# Patient Record
Sex: FEMALE
[De-identification: names, ages, dates, MRNs, and addresses within clinical notes are randomized per-mention and may not be internally consistent; named-entity substitution may affect disease eponyms.]

---

## 2017-11-21 ENCOUNTER — CHARTING TRANS (OUTPATIENT)
Dept: OTHER | Age: 27
End: 2017-11-21

## 2017-11-21 ENCOUNTER — LAB SERVICES (OUTPATIENT)
Dept: OTHER | Age: 27
End: 2017-11-21

## 2017-11-21 LAB — RAPID STREP GROUP A: POSITIVE

## 2018-11-02 VITALS
HEIGHT: 63 IN | OXYGEN SATURATION: 99 % | BODY MASS INDEX: 44.3 KG/M2 | RESPIRATION RATE: 18 BRPM | SYSTOLIC BLOOD PRESSURE: 124 MMHG | WEIGHT: 250 LBS | HEART RATE: 108 BPM | TEMPERATURE: 103 F | DIASTOLIC BLOOD PRESSURE: 68 MMHG

## 2024-04-03 ENCOUNTER — OFFICE VISIT (OUTPATIENT)
Dept: OBGYN CLINIC | Facility: CLINIC | Age: 34
End: 2024-04-03

## 2024-04-03 VITALS
HEIGHT: 63 IN | BODY MASS INDEX: 46.42 KG/M2 | HEART RATE: 77 BPM | SYSTOLIC BLOOD PRESSURE: 139 MMHG | WEIGHT: 262 LBS | DIASTOLIC BLOOD PRESSURE: 84 MMHG

## 2024-04-03 DIAGNOSIS — Z01.419 NORMAL GYNECOLOGIC EXAMINATION: Primary | ICD-10-CM

## 2024-04-03 PROCEDURE — 99395 PREV VISIT EST AGE 18-39: CPT | Performed by: OBSTETRICS & GYNECOLOGY

## 2024-04-03 PROCEDURE — 3075F SYST BP GE 130 - 139MM HG: CPT | Performed by: OBSTETRICS & GYNECOLOGY

## 2024-04-03 PROCEDURE — 3008F BODY MASS INDEX DOCD: CPT | Performed by: OBSTETRICS & GYNECOLOGY

## 2024-04-03 PROCEDURE — 3079F DIAST BP 80-89 MM HG: CPT | Performed by: OBSTETRICS & GYNECOLOGY

## 2024-04-03 RX ORDER — ALPRAZOLAM 0.5 MG/1
0.5 TABLET ORAL NIGHTLY
COMMUNITY
Start: 2024-01-25

## 2024-04-03 NOTE — PROGRESS NOTES
Sofy Reza is a 33 year old female  Patient's last menstrual period was 2024 (approximate).   Chief Complaint   Patient presents with    Annual     Pt presents for annual exam, no concerns   Menses normal, monthly    OBSTETRICS HISTORY:     OB History    Para Term  AB Living   2 1 1   1     SAB IAB Ectopic Multiple Live Births     1            # Outcome Date GA Lbr Aaron/2nd Weight Sex Delivery Anes PTL Lv   2 Term 11/17/15     Caesarean      1 IAB                GYNE HISTORY:     Hx Prior Abnormal Pap: Yes (HPV+)  Pap Result Notes:  LSIL   Menarche: 11-11 y/o (4/3/2024  8:31 AM)  Period Cycle (Days): 28-30 days (4/3/2024  8:31 AM)  Period Duration (Days): 5 days (4/3/2024  8:31 AM)  Period Flow: Heavy to moderate (4/3/2024  8:31 AM)  Use of Birth Control (if yes, specify type): None (4/3/2024  8:31 AM)  Hx Prior Abnormal Pap: Yes (HPV+) (4/3/2024  8:31 AM)  Pap Result Notes:  LSIL (4/3/2024  8:31 AM)         No data to display                  MEDICAL HISTORY:     History reviewed. No pertinent past medical history.    SURGICAL HISTORY:     Past Surgical History:   Procedure Laterality Date     DELIVERY ONLY         SOCIAL HISTORY:     Social History     Socioeconomic History    Marital status: Single   Tobacco Use    Smoking status: Former     Types: Cigarettes    Smokeless tobacco: Never   Substance and Sexual Activity    Alcohol use: Yes     Comment: occ    Drug use: Never        FAMILY HISTORY:     Family History   Problem Relation Age of Onset    Other (cancer) Father     Other (lymphoma) Mother     High Cholesterol Mother        MEDICATIONS:       Current Outpatient Medications:     ALPRAZolam 0.5 MG Oral Tab, Take 1 tablet (0.5 mg total) by mouth nightly., Disp: , Rfl:     ALLERGIES:     No Known Allergies      REVIEW OF SYSTEMS:     Constitutional:    denies fever / chills  Eyes:     denies blurred or double vision  Cardiovascular:  denies chest pain or  palpitations  Respiratory:    denies shortness of breath  Gastrointestinal:  denies severe abdominal pain, frequent diarrhea or constipation, nausea / vomiting  Genitourinary:    denies dysuria, bothersome incontinence  Skin/Breast:   denies any breast pain, lumps, or discharge  Neurological:    denies frequent severe headaches  Psychiatric:   denies depression or anxiety, thoughts of harming self or others  Heme/Lymph:    denies easy bruising or bleeding      PHYSICAL EXAM:   Blood pressure 139/84, pulse 77, height 5' 3\" (1.6 m), weight 262 lb (118.8 kg), last menstrual period 03/29/2024.  Constitutional:  well developed, well nourished  Head/Face:  normocephalic  Neck/Thyroid: thyroid symmetric, no thyromegaly, no nodules, no adenopathy  Lymphatic: no abnormal supraclavicular or axillary adenopathy is noted  Respiratory:      chest wall symmetric and nontender on palpation, clear to asculation bilateral, no wheezing, rales, ronchi, and resonance normal upon percussion  Cardiovascular: chest normal in appearance, regular rate and rhythm, no murmurs, PMI palpated midclavicular line  Breast:   normal without palpable masses, tenderness, asymmetry, nipple discharge, nipple retraction or skin changes  Abdomen:   soft, nontender, nondistended, no masses  Skin/Hair:  no unusual rashes or bruises  Extremities:  no edema, no cyanosis, non tender bilaterally  Psychiatric:   oriented to time, place, person and situation. Appropriate mood and affect    Pelvic Exam:  External Genitalia:  normal appearance, hair distribution, and no lesions  Urethral Meatus:   normal in size, location, without lesions   Bladder:    no fullness, masses or tenderness  Vagina:    normal appearance without lesions, no abnormal discharge, positive light menses  Cervix:    No lesions, normal friability   Uterus:    normal in size, 8 wk sized, normal contour, position, mobility, without  motion tenderness  Adnexa:   normal without masses or  tenderness  Perineum:   normal  Anus: no hemorroids         ASSESSMENT & PLAN:     Sofy was seen today for annual.    Diagnoses and all orders for this visit:    Normal gynecologic examination  -     ThinPrep Pap with HPV Reflex, Chlamydia/GC; Future  -     Chlamydia/Gc Amplification; Future    Hx cin1  Nutrition, weight screening and exercise were discussed with the patient.  Exercise should encompass approximately 150 minutes/week.  Self breast exam counseling was also given.  I advised the patient to avoid tobacco, drugs and alcohol.  Influenza vaccine was offered if seasonally appropriate.  HPV and STD prevention counseling was given.  Health maintenance checklist  was reviewed including Pap smear, cervical cultures, and mammogram screening if age-appropriate.  Appropriate follow-up scheduling was discussed with the patient.          FOLLOW-UP     Return in about 1 year (around 4/3/2025) for Annual exam.      Lorenzo Rocha MD  4/3/2024

## 2024-04-04 LAB
C TRACH DNA SPEC QL NAA+PROBE: NEGATIVE
N GONORRHOEA DNA SPEC QL NAA+PROBE: NEGATIVE

## 2024-04-24 LAB — HPV I/H RISK 1 DNA SPEC QL NAA+PROBE: NEGATIVE

## (undated) NOTE — MR AVS SNAPSHOT
After Visit Summary   4/3/2024    Sofy Reza   MRN: [id number]           Visit Information     Date & Time  4/3/2024  8:30 AM Provider  Lorenzo Rocha MD Department  [facility] - OB/GYN Dept. Phone  [phone]      Your Vitals Were  Most recent update: 4/3/2024  8:36 AM    BP   139/84 (BP Location: Right arm, Patient Position: Sitting, Cuff Size: large)          Pulse   77          Ht   63\"          Wt   262 lb          LMP   03/29/2024 (Approximate)             BMI   46.41 kg/m²         Allergies as of 4/3/2024  Review status set to Review Complete on 4/3/2024   No Known Allergies     Your Current Medications        Dosage    ALPRAZolam 0.5 MG Oral Tab Take 1 tablet (0.5 mg total) by mouth nightly.      Diagnoses for This Visit    Normal gynecologic examination   [[id number]]  -  Primary           Follow-up    Return in about 1 year (around 4/3/2025) for Annual exam.     We Ordered the Following     Normal Orders This Visit    Chlamydia/Gc Amplification [[id number] CUSTOM]     ThinPrep PAP with HPV Reflex Request [[id number] CUSTOM]     ThinPrep Pap with HPV Reflex, Chlamydia/GC [[id number] CUSTOM]     Future Labs/Procedures Expected by Expires    Chlamydia/Gc Amplification [[id number] CUSTOM]  4/3/2024 (Approximate) 4/3/2025    ThinPrep Pap with HPV Reflex, Chlamydia/GC [[id number] CUSTOM]  4/3/2024 4/3/2025      Future Appointments        Provider Department    4/3/2025 8:45 AM Lorenzo Rocha [facility] - OB/GYN      Follow-up Instructions    Return in about 1 year (around 4/3/2025) for Annual exam.         April 5, 2024      Sofy Reza   [address]  [facility] [address]     Dear Sofy :    Thank you for enrolling in [employer]. Please follow the instructions below to securely access your online medical record. [employer] allows you to send messages to your doctor, view test results, renew  prescriptions and request appointments.    How Do I Sign Up?  1. In your Internet browser, go to [email or web address]  2. Click on the Activate your Account if you have an activation code in the box under the *New User? section.   3. Enter your [employer] Activation Code exactly as it appears below. You will not need to use this code after you have completed the sign-up process. If you do not sign up before the expiration date, you must request a new code.    [employer] Activation Code: [email or web address]  Expires: 5/18/2024  8:27 AM    4. Enter your Zip Code and Date of Birth (mm/dd/yyyy) as indicated and click Next. You will be taken to the next sign-up page.  5. Create a [employer] Username. This will be your [employer] login Username and cannot be changed, so think of one that is secure and easy to remember.  6. Create a [employer] password. You can change your password at any time.  7. Choose a Security Question and enter your Answer and click Next. This can be used at a later time if you forget your password.   8. Enter your e-mail address. You will receive e-mail notification when new information is available in [employer].  9. Click Sign In. You can now view your medical record.    Additional Information  If you have questions, you can call [phone] to talk to our [employer] staff. Remember, [employer] is NOT to be used for urgent needs. For medical emergencies, dial 911.    Sincerely,    Lorenzo Rocha MD              Did you know that [facility] primary care physicians now offer Video Visits through [employer] for adult patients for a variety of conditions such as allergies, back pain and cold symptoms? Skip the drive and waiting room and online chat with a doctor face-to-face using your web-cam enabled computer or mobile device wherever you are. Video Visits cost $50 and can be paid hassle-free using a credit, debit, or health savings card.  Not active on [employer]? Ask us how to get signed up today!          If you  receive a survey from [employer], please take a few minutes to complete it and provide feedback. We strive to deliver the best patient experience and are looking for ways to make improvements. Your feedback will help us do so. For more information on [employer], please visit [email or web address]           No text in SmartText           No text in SmartText